# Patient Record
Sex: MALE | Race: WHITE | NOT HISPANIC OR LATINO | URBAN - METROPOLITAN AREA
[De-identification: names, ages, dates, MRNs, and addresses within clinical notes are randomized per-mention and may not be internally consistent; named-entity substitution may affect disease eponyms.]

---
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
## 2022-07-06 NOTE — H&P PST PEDIATRIC - PROBLEM/PLAN-2
Latex allergy reported    Health Maintenance Due   Topic Date Due   • Medicare Advantage- Medicare Wellness Visit  01/01/2022   • COVID-19 Vaccine (4 - Booster for Pfizer series) 03/27/2022       Patient is due for topics as listed above but is not proceeding with Immunization(s) COVID-19 at this time. She wants to think  About it a little longer.    Pt will schedule MWV upon leaving.    No depression per pt    COVID vaccines completed    [phone] is the best number for the pt     DISPLAY PLAN FREE TEXT

## 2022-10-21 NOTE — H&P PST ADULT - SKIN/BREAST
Daily Note     Today's date: 10/21/2022  Patient name: Yohana Cordon  : 1952  MRN: [id number]  Referring provider: Galen Langley MD  Dx:   Encounter Diagnosis     ICD-10-CM    1  Lymphedema  I89 0    2  Chronic left shoulder pain  M25 512     G89 29                   Subjective: Pt reports overall she is doing better, less PN and tenderness thru neck, R arm doing well, L arm PN still notable  Objective: See treatment diary below      Assessment: Tolerated treatment well  Patient would benefit from continued PT    Measured for new garments this day, updating order forms, will complete for pt to provide NV  Reduced soft tissue restriction but still a general ttp thru mid back to c/s  Plan: Continue per plan of care           Precautions:       Manuals  9- 9-30 10-3 10-4 10-12 10-14 10-19 10-21    MLD R UE jph jph jph jph jph jph jph jph jph    MLD L axilla + upper arm jph jph Manpreet Bolk jph jph jph jph    MFR L post shldr jph HNJ KUQ ZPU RRC UWP LQA VKR SAMANTHA    R UE compression jph night jph night jph day jph day jph day jph night jph day jph day jph day    Gentle scap mobs B - seated jph jph jph jph jph jph jph jph jph    PROM L shldr DMI TJF EQC IMD ECU HKY IYT IQE jph    Neuro Re-Ed                                                                                                        Ther Ex             Seated pec str             Doorway str             Scap retraction    GrnTB 3s x20         B shldr ER    GrnTB x20         B shldr extension    GrnTB x20         UBE f/r     1 5'e x3' total x1'e 2' total  x1 5'e x3' total                               Ther Activity                                       Gait Training                                       Modalities details…

## 2023-11-15 NOTE — ASU PATIENT PROFILE, PEDIATRIC - REASON FOR ADMISSION, PROFILE
Discharge Instruction for Undelivered Patients      You were seen for: Fetal Assessment and Decreased fetal movement  We Consulted: Dr. Bautista  dental surgery per parents

## 2024-05-02 ENCOUNTER — OUTPATIENT (OUTPATIENT)
Dept: OUTPATIENT SERVICES | Facility: HOSPITAL | Age: 24
LOS: 1 days | End: 2024-05-02
Payer: COMMERCIAL

## 2024-05-02 VITALS
HEART RATE: 90 BPM | WEIGHT: 240.08 LBS | HEIGHT: 64 IN | RESPIRATION RATE: 16 BRPM | SYSTOLIC BLOOD PRESSURE: 117 MMHG | OXYGEN SATURATION: 98 % | TEMPERATURE: 98 F | DIASTOLIC BLOOD PRESSURE: 70 MMHG

## 2024-05-02 DIAGNOSIS — Z98.890 OTHER SPECIFIED POSTPROCEDURAL STATES: Chronic | ICD-10-CM

## 2024-05-02 DIAGNOSIS — K05.6 PERIODONTAL DISEASE, UNSPECIFIED: ICD-10-CM

## 2024-05-02 DIAGNOSIS — K40.90 UNILATERAL INGUINAL HERNIA, WITHOUT OBSTRUCTION OR GANGRENE, NOT SPECIFIED AS RECURRENT: Chronic | ICD-10-CM

## 2024-05-02 DIAGNOSIS — K02.9 DENTAL CARIES, UNSPECIFIED: ICD-10-CM

## 2024-05-02 DIAGNOSIS — K02.62 DENTAL CARIES ON SMOOTH SURFACE PENETRATING INTO DENTIN: ICD-10-CM

## 2024-05-02 LAB
ANION GAP SERPL CALC-SCNC: 16 MMOL/L — SIGNIFICANT CHANGE UP (ref 5–17)
BUN SERPL-MCNC: 6 MG/DL — LOW (ref 7–23)
CALCIUM SERPL-MCNC: 9.8 MG/DL — SIGNIFICANT CHANGE UP (ref 8.4–10.5)
CHLORIDE SERPL-SCNC: 101 MMOL/L — SIGNIFICANT CHANGE UP (ref 96–108)
CO2 SERPL-SCNC: 24 MMOL/L — SIGNIFICANT CHANGE UP (ref 22–31)
CREAT SERPL-MCNC: 0.71 MG/DL — SIGNIFICANT CHANGE UP (ref 0.5–1.3)
EGFR: 134 ML/MIN/1.73M2 — SIGNIFICANT CHANGE UP
GLUCOSE SERPL-MCNC: 102 MG/DL — HIGH (ref 70–99)
HCT VFR BLD CALC: 45.4 % — SIGNIFICANT CHANGE UP (ref 39–50)
HGB BLD-MCNC: 15.2 G/DL — SIGNIFICANT CHANGE UP (ref 13–17)
MCHC RBC-ENTMCNC: 29.7 PG — SIGNIFICANT CHANGE UP (ref 27–34)
MCHC RBC-ENTMCNC: 33.5 GM/DL — SIGNIFICANT CHANGE UP (ref 32–36)
MCV RBC AUTO: 88.8 FL — SIGNIFICANT CHANGE UP (ref 80–100)
NRBC # BLD: 0 /100 WBCS — SIGNIFICANT CHANGE UP (ref 0–0)
PLATELET # BLD AUTO: 242 K/UL — SIGNIFICANT CHANGE UP (ref 150–400)
POTASSIUM SERPL-MCNC: 3.9 MMOL/L — SIGNIFICANT CHANGE UP (ref 3.5–5.3)
POTASSIUM SERPL-SCNC: 3.9 MMOL/L — SIGNIFICANT CHANGE UP (ref 3.5–5.3)
RBC # BLD: 5.11 M/UL — SIGNIFICANT CHANGE UP (ref 4.2–5.8)
RBC # FLD: 13.3 % — SIGNIFICANT CHANGE UP (ref 10.3–14.5)
SODIUM SERPL-SCNC: 141 MMOL/L — SIGNIFICANT CHANGE UP (ref 135–145)
WBC # BLD: 8.16 K/UL — SIGNIFICANT CHANGE UP (ref 3.8–10.5)
WBC # FLD AUTO: 8.16 K/UL — SIGNIFICANT CHANGE UP (ref 3.8–10.5)

## 2024-05-02 PROCEDURE — 80048 BASIC METABOLIC PNL TOTAL CA: CPT

## 2024-05-02 PROCEDURE — 85027 COMPLETE CBC AUTOMATED: CPT

## 2024-05-02 PROCEDURE — G0463: CPT

## 2024-05-02 RX ORDER — OXCARBAZEPINE 300 MG/1
5 TABLET, FILM COATED ORAL
Refills: 0 | DISCHARGE

## 2024-05-02 RX ORDER — DIAZEPAM 5 MG
17.5 TABLET ORAL
Qty: 0 | Refills: 0 | DISCHARGE

## 2024-05-02 RX ORDER — SODIUM CHLORIDE 9 MG/ML
3 INJECTION INTRAMUSCULAR; INTRAVENOUS; SUBCUTANEOUS EVERY 8 HOURS
Refills: 0 | Status: DISCONTINUED | OUTPATIENT
Start: 2024-05-23 | End: 2024-06-06

## 2024-05-02 RX ORDER — OXCARBAZEPINE 300 MG/1
15 TABLET, FILM COATED ORAL
Qty: 0 | Refills: 0 | DISCHARGE

## 2024-05-02 RX ORDER — MIDAZOLAM HYDROCHLORIDE 1 MG/ML
1 INJECTION, SOLUTION INTRAMUSCULAR; INTRAVENOUS
Qty: 0 | Refills: 0 | DISCHARGE

## 2024-05-02 RX ORDER — LIDOCAINE HCL 20 MG/ML
0.2 VIAL (ML) INJECTION ONCE
Refills: 0 | Status: DISCONTINUED | OUTPATIENT
Start: 2024-05-23 | End: 2024-06-06

## 2024-05-02 NOTE — H&P PST ADULT - HISTORY OF PRESENT ILLNESS
23 year  old male with hx of autism and seizure disorder ( last seizure 12/2019) coming in for comprehensive dental treatment under anesthesia on 5/23/2024     23 year old Obese male with PMhx of autism and seizure disorder ( last seizure 12/2019) coming in for comprehensive dental treatment under anesthesia on 5/23/2024

## 2024-05-02 NOTE — H&P PST ADULT - PROBLEM SELECTOR PLAN 1
comprehensive dental treatment under anesthesia on 5/23/2024  [facility] labs  send  preprocedure instructions discussed   trileptal am of procedure

## 2024-05-02 NOTE — H&P PST ADULT - ASSESSMENT
DASI score:  DASIactivity:  loose teeth or dentures: plan for Dental procedure   airway   DASI score: 6.8  DASIactivity: scott, walking, stairs without difficulties   loose teeth or dentures: plan for Dental procedure   airway mp3

## 2024-05-02 NOTE — H&P PST ADULT - NSANTHOSAYNRD_GEN_A_CORE
No. KHADAR screening performed.  STOP BANG Legend: 0-2 = LOW Risk; 3-4 = INTERMEDIATE Risk; 5-8 = HIGH Risk mom denies any hx of sleep apnea or snoring/No. KHADAR screening performed.  STOP BANG Legend: 0-2 = LOW Risk; 3-4 = INTERMEDIATE Risk; 5-8 = HIGH Risk

## 2024-05-22 ENCOUNTER — TRANSCRIPTION ENCOUNTER (OUTPATIENT)
Age: 24
End: 2024-05-22

## 2024-05-23 ENCOUNTER — OUTPATIENT (OUTPATIENT)
Dept: OUTPATIENT SERVICES | Facility: HOSPITAL | Age: 24
LOS: 1 days | End: 2024-05-23
Payer: COMMERCIAL

## 2024-05-23 ENCOUNTER — TRANSCRIPTION ENCOUNTER (OUTPATIENT)
Age: 24
End: 2024-05-23

## 2024-05-23 VITALS
DIASTOLIC BLOOD PRESSURE: 64 MMHG | RESPIRATION RATE: 18 BRPM | WEIGHT: 240.08 LBS | HEIGHT: 64 IN | SYSTOLIC BLOOD PRESSURE: 127 MMHG | TEMPERATURE: 98 F | OXYGEN SATURATION: 100 % | HEART RATE: 89 BPM

## 2024-05-23 VITALS
OXYGEN SATURATION: 96 % | RESPIRATION RATE: 16 BRPM | HEART RATE: 97 BPM | DIASTOLIC BLOOD PRESSURE: 79 MMHG | SYSTOLIC BLOOD PRESSURE: 126 MMHG | TEMPERATURE: 98 F

## 2024-05-23 DIAGNOSIS — Z98.890 OTHER SPECIFIED POSTPROCEDURAL STATES: Chronic | ICD-10-CM

## 2024-05-23 DIAGNOSIS — K02.62 DENTAL CARIES ON SMOOTH SURFACE PENETRATING INTO DENTIN: ICD-10-CM

## 2024-05-23 DIAGNOSIS — K05.6 PERIODONTAL DISEASE, UNSPECIFIED: ICD-10-CM

## 2024-05-23 DIAGNOSIS — K40.90 UNILATERAL INGUINAL HERNIA, WITHOUT OBSTRUCTION OR GANGRENE, NOT SPECIFIED AS RECURRENT: Chronic | ICD-10-CM

## 2024-05-23 PROCEDURE — C9399: CPT

## 2024-05-23 PROCEDURE — D2391: CPT

## 2024-05-23 PROCEDURE — D4210: CPT

## 2024-05-23 PROCEDURE — D2330: CPT

## 2024-05-23 PROCEDURE — D1110: CPT

## 2024-05-23 PROCEDURE — D0210: CPT

## 2024-05-23 PROCEDURE — D2392: CPT

## 2024-05-23 PROCEDURE — D4341: CPT

## 2024-05-23 RX ORDER — OXCARBAZEPINE 300 MG/1
15 TABLET, FILM COATED ORAL
Refills: 0 | DISCHARGE

## 2024-05-23 RX ORDER — FENTANYL CITRATE 50 UG/ML
25 INJECTION INTRAVENOUS
Refills: 0 | Status: DISCONTINUED | OUTPATIENT
Start: 2024-05-23 | End: 2024-05-23

## 2024-05-23 RX ORDER — ONDANSETRON 8 MG/1
4 TABLET, FILM COATED ORAL ONCE
Refills: 0 | Status: DISCONTINUED | OUTPATIENT
Start: 2024-05-23 | End: 2024-06-06

## 2024-05-23 RX ORDER — OXCARBAZEPINE 300 MG/1
5 TABLET, FILM COATED ORAL
Refills: 0 | DISCHARGE

## 2024-05-23 NOTE — PRE-ANESTHESIA EVALUATION ADULT - NSANTHPMHFT_GEN_ALL_CORE
No known cardiac disease  Autism - good historian, verbal, IV placed in preop, family at bedside  Seizure disorder - managed

## 2024-05-23 NOTE — ASU DISCHARGE PLAN (ADULT/PEDIATRIC) - ASU DC SPECIAL INSTRUCTIONSFT
comprehensive dental treatment under general anesthesia   ******************************************************************************************   SEVERE DENTAL CARIES NOTED  ******************************************************************************************   tylenol prn pain and give tylenol for the next two days and nights if needed   ******************************************************************************************   exam,, xrays, cleaning, periodontal treatment, restorations and flouride performed  ******************************************************************************************   return to program tomorrow if patient feels well    see Dr Pate at [facility] on june 4 at 2 pm appt is set comprehensive dental treatment under general anesthesia   ******************************************************************************************   SEVERE DENTAL CARIES NOTED  ******************************************************************************************   tylenol prn pain and give tylenol for the next two days and nights if needed   ******************************************************************************************   exam,, xrays, cleaning, periodontal treatment, restorations and flouride performed  ******************************************************************************************   return to program tomorrow if patient feels well  ******************************************************************************************   see Dr Pate at [facility] on june 4 at 2 pm appt is set

## 2024-05-23 NOTE — ASU DISCHARGE PLAN (ADULT/PEDIATRIC) - NURSING INSTRUCTIONS
OK to take Tylenol/Acetaminophen at 1:15PM TODAY (last dose @   7:15AM  in operating room)  for pain and every 6 hours after as needed. OK to take Motrin/Ibuprofen at 2:15PM TODAY (last dose @  8:15AM   in operating room)  for pain and every 6 hours after as needed.

## 2024-05-23 NOTE — ASU DISCHARGE PLAN (ADULT/PEDIATRIC) - NS MD DC FALL RISK RISK
For information on Fall & Injury Prevention, visit: [email or web address] OR  [email or web address] OR  https://www.cdc.gov/steadi/patient.html

## 2024-05-23 NOTE — ASU PATIENT PROFILE, ADULT - PRO ARRIVE FROM
[facility] Pediatrics Circumcision Procedure Note     [facility]      Indication: parental preference    Consent: Informed consent was obtained from the parent(s), see scanned form.      Time Out::                        Right patient: Yes      Right body part: Yes      Right procedure Yes  Anesthesia:    Dorsal nerve block - 1% Lidocaine without epinephrine was infiltrated with a total of 0.8 cc    Pre-procedure:   The area was prepped with betadine, then draped in a sterile fashion. Sterile gloves were worn at all times during the procedure.    Procedure:   Gomco 1.3 device routine circumcision    Complications:   None at this time    Siria Last MD   home

## 2024-05-23 NOTE — ASU PATIENT PROFILE, ADULT - NSICDXPASTMEDICALHX_GEN_ALL_CORE_FT
PAST MEDICAL HISTORY:  Autism disorder     Dental caries     Gross motor delay     Obesity     OM (otitis media), recurrent     Seizure disorder grandmal seizure on 06/09/2018- admitted to [place] last one 12/2019

## 2024-05-23 NOTE — PRE-ANESTHESIA EVALUATION ADULT - NSANTHOSAYNRD_GEN_A_CORE
mom denies any hx of sleep apnea or snoring/No. KHADAR screening performed.  STOP BANG Legend: 0-2 = LOW Risk; 3-4 = INTERMEDIATE Risk; 5-8 = HIGH Risk

## 2024-05-23 NOTE — PRE-ANESTHESIA EVALUATION ADULT - LAST ECHOCARDIOGRAM
denies Retention Suture Text: Retention sutures were placed to support the closure and prevent dehiscence.

## 2024-08-20 NOTE — ASU DISCHARGE PLAN (ADULT/PEDIATRIC). - ACCOMPANYING ADULT'S SIGNATURE_______________________________________
acetaminophen 325 mg oral tablet: 2 tab(s) orally every 6 hours  baclofen 10 mg oral tablet: 1 tab(s) orally 3 times a day  ibuprofen 600 mg oral tablet: 1 tab(s) orally every 6 hours  insulin glargine 100 units/mL subcutaneous solution: 30 unit(s) subcutaneous once (at bedtime)  insulin lispro 100 units/mL injectable solution: 5 unit(s) injectable 3 times a day (before meals)  Jardiance 25 mg oral tablet: 1 tab(s) orally once a day  latanoprost 0.005% ophthalmic solution: 1 drop(s) in each eye  levothyroxine 75 mcg (0.075 mg) oral tablet: 1 tab(s) orally once a day  losartan 50 mg oral tablet: 1 tab(s) orally once a day  melatonin 10 mg oral tablet: 1 tab(s) orally  metoprolol succinate 100 mg oral capsule, extended release: 1 cap(s) orally once a day  multivitamin:   nabumetone 500 mg oral tablet: 1 tab(s) orally  oxyCODONE 5 mg oral tablet: 1 tab(s) orally once as needed for Moderate Pain (4 - 6) MDD: 4  pregabalin 150 mg oral capsule: 1 cap(s) orally every 8 hours  traMADol 50 mg oral tablet: 1 tab(s) orally  Xalatan 0.005% ophthalmic solution: 1 drop(s) in each eye   Statement Selected acetaminophen 325 mg oral tablet: 2 tab(s) orally every 6 hours  baclofen 10 mg oral tablet: 1 tab(s) orally 3 times a day  ibuprofen 600 mg oral tablet: 1 tab(s) orally every 6 hours  insulin glargine 100 units/mL subcutaneous solution: 30 unit(s) subcutaneous once (at bedtime)  insulin lispro 100 units/mL injectable solution: 5 unit(s) injectable 3 times a day (before meals)  Jardiance 25 mg oral tablet: 1 tab(s) orally once a day  latanoprost 0.005% ophthalmic solution: 1 drop(s) in each eye  levothyroxine 75 mcg (0.075 mg) oral tablet: 1 tab(s) orally once a day  losartan 50 mg oral tablet: 1 tab(s) orally once a day  melatonin 10 mg oral tablet: 1 tab(s) orally  metoprolol succinate 100 mg oral capsule, extended release: 1 cap(s) orally once a day  multivitamin:   nabumetone 500 mg oral tablet: 1 tab(s) orally  pregabalin 150 mg oral capsule: 1 cap(s) orally every 8 hours  traMADol 50 mg oral tablet: 1 tab(s) orally  Xalatan 0.005% ophthalmic solution: 1 drop(s) in each eye

## 2024-11-12 NOTE — PRE-ANESTHESIA EVALUATION ADULT - LAST ECHOCARDIOGRAM
11/13/24  1325  Called results to patient and plan for starting metoprolol succinate 25 mg daily, nuclear stress test.  Patient verbalized understanding of results and is agreeable to plan; denies CP or syncope but knows to call if experiencing either as nurse informed him to do so to be seen earlier.    New Rx sent for approval.    Nuclear stress test ordered and task sent for scheduling stress test and follow up appt after testing.    11/12/24  1354  Called patient; no answer. Left voice message for patient to return call for Holter results and directives to include starting a beta blocker and a stress test from Dr. Marx.    ----- Message from Richardson Marx sent at 11/11/2024  8:44 AM EST -----      Let him know that there is NSVT on his holter. Set him up for nuclear stress test for ischemia evaluation and start on metoprolol XL 25 mg daily if not taking any beta blocker already.   See me in clinic after stress test done. If he is having syncope or chest pain or any worsening symptoms then see me soon.  ----- Message -----  From: Kenny Reed DO  Sent: 11/8/2024  12:11 PM EST  To: Richardson Marx MD   no
